# Patient Record
(demographics unavailable — no encounter records)

---

## 2024-10-23 NOTE — HISTORY OF PRESENT ILLNESS
[de-identified] : 28M who presents with imbalance since June. Around that time, he was on an extended flight to [facility]. His symptoms possibly started after that time. He feels imbalance and wobbly whenever he is walking. No vertigo. He has some ear symptoms of fullness and pressure when the imbalance is worse. No otalgia, otorrhea, tinnitus, acute hearing changes. He went to the ER a few times. CT head wo contrast was normal. Audiogram at [facility] was reportedly normal.

## 2024-10-23 NOTE — PHYSICAL EXAM
[TextEntry] : General: AAO, no significant distress Psychiatric: affect pleasant and within normal limits Eyes: relatively symmetric, no obvious nystagmus Skin: no significant lesions on face Nose: no significant lesions; patent. Oral Cavity & Oropharynx: no significant deformity or lesions Neck/Lymphatics: no significant masses or abnormal cervical nodes palpated Respiratory: breathing comfortably; no significant distress Neurologic: cranial nerves II-XII grossly intact; EOMI Facial function: symmetric   Ear examination performed under binocular otologic microscope: Left Ear External: Pinna and periauricular area is normal. Canal: Ear canal skin is not inflamed or edematous. Left cerumen impaction cleaned under microscopy with instrumentation Tympanic Membrane: Intact and in good position.   Right Ear External: Pinna and periauricular area is normal. Canal: Ear canal skin is not inflamed or edematous. Right cerumen impaction cleaned under microscopy with instrumentation Tympanic Membrane: Intact and in good position.   Procedure: Left cerumen removal Pre-operative Diagnosis: Left cerumen impaction Post-operative Diagnosis: Left cerumen impaction Anesthesia: None Procedure Details: The patient was placed in the supine position. The left ear canal was determined to be impacted with cerumen. A curette and/or suction was used to remove the cerumen impaction under microscopy. Condition: Stable. Patient tolerated procedure well. Complications: None.   Procedure: Right cerumen removal Pre-operative Diagnosis: Right cerumen impaction Post-operative Diagnosis: Right cerumen impaction Anesthesia: None Procedure Details: The patient was placed in the supine position. The right ear canal was determined to be impacted with cerumen. A curette and/or suction was used to remove the cerumen impaction under microscopy. Condition: Stable. Patient tolerated procedure well. Complications: None.   Fukuda without turn ROMBERG POSITIVE No gaze or spontaneous nystagmus Head thrust test negative Head shake nystagmus negative [place]-hallpike negative

## 2024-10-23 NOTE — PHYSICAL EXAM
[TextEntry] : General: AAO, no significant distress Psychiatric: affect pleasant and within normal limits Eyes: relatively symmetric, no obvious nystagmus Skin: no significant lesions on face Nose: no significant lesions; patent. Oral Cavity & Oropharynx: no significant deformity or lesions Neck/Lymphatics: no significant masses or abnormal cervical nodes palpated Respiratory: breathing comfortably; no significant distress Neurologic: cranial nerves II-XII grossly intact; EOMI Facial function: symmetric   Ear examination performed under binocular otologic microscope: Left Ear External: Pinna and periauricular area is normal. Canal: Ear canal skin is not inflamed or edematous. Left cerumen impaction cleaned under microscopy with instrumentation Tympanic Membrane: Intact and in good position.   Right Ear External: Pinna and periauricular area is normal. Canal: Ear canal skin is not inflamed or edematous. Right cerumen impaction cleaned under microscopy with instrumentation Tympanic Membrane: Intact and in good position.   Procedure: Left cerumen removal Pre-operative Diagnosis: Left cerumen impaction Post-operative Diagnosis: Left cerumen impaction Anesthesia: None Procedure Details: The patient was placed in the supine position. The left ear canal was determined to be impacted with cerumen. A curette and/or suction was used to remove the cerumen impaction under microscopy. Condition: Stable. Patient tolerated procedure well. Complications: None.   Procedure: Right cerumen removal Pre-operative Diagnosis: Right cerumen impaction Post-operative Diagnosis: Right cerumen impaction Anesthesia: None Procedure Details: The patient was placed in the supine position. The right ear canal was determined to be impacted with cerumen. A curette and/or suction was used to remove the cerumen impaction under microscopy. Condition: Stable. Patient tolerated procedure well. Complications: None.   Fukuda without turn ROMBERG POSITIVE No gaze or spontaneous nystagmus Head thrust test negative Head shake nystagmus negative [place]-hallpike negative

## 2024-10-23 NOTE — HISTORY OF PRESENT ILLNESS
[de-identified] : 28M who presents with imbalance since June. Around that time, he was on an extended flight to [facility]. His symptoms possibly started after that time. He feels imbalance and wobbly whenever he is walking. No vertigo. He has some ear symptoms of fullness and pressure when the imbalance is worse. No otalgia, otorrhea, tinnitus, acute hearing changes. He went to the ER a few times. CT head wo contrast was normal. Audiogram at [facility] was reportedly normal.

## 2024-10-23 NOTE — HISTORY OF PRESENT ILLNESS
[de-identified] : 28M who presents with imbalance since June. Around that time, he was on an extended flight to [facility]. His symptoms possibly started after that time. He feels imbalance and wobbly whenever he is walking. No vertigo. He has some ear symptoms of fullness and pressure when the imbalance is worse. No otalgia, otorrhea, tinnitus, acute hearing changes. He went to the ER a few times. CT head wo contrast was normal. Audiogram at [facility] was reportedly normal.

## 2024-10-23 NOTE — ASSESSMENT
[FreeTextEntry1] : Imbalance - discussed with the patient how the balance receives contributions from 4 systems: the brain, the eyes, the inner ear, and the legs/feet - discussed with the patient that each of these systems can be investigated - discussed with the patient that any eye issues should be evaluated by an ophthalmologist - pt wears glasses - discussed with the patient that any foot/leg issues should be evaluated by a podiatrist or orthopedic surgeon - pt with no foot/leg issues - MRI IAC w contrast to investigate the brain - VNG to investigate the inner ear - audiogram ordered for return visit - discussed that the diagnosis of mal de debarquement syndrome is likely - f/u after testing, telehealth  Bilateral cerumen impaction - Bilateral cerumen impaction removed under microscopy with instrumentation

## 2024-10-23 NOTE — PHYSICAL EXAM
[TextEntry] : General: AAO, no significant distress Psychiatric: affect pleasant and within normal limits Eyes: relatively symmetric, no obvious nystagmus Skin: no significant lesions on face Nose: no significant lesions; patent. Oral Cavity & Oropharynx: no significant deformity or lesions Neck/Lymphatics: no significant masses or abnormal cervical nodes palpated Respiratory: breathing comfortably; no significant distress Neurologic: cranial nerves II-XII grossly intact; EOMI Facial function: symmetric   Ear examination performed under binocular otologic microscope: Left Ear External: Pinna and periauricular area is normal. Canal: Ear canal skin is not inflamed or edematous. Left cerumen impaction cleaned under microscopy with instrumentation Tympanic Membrane: Intact and in good position.   Right Ear External: Pinna and periauricular area is normal. Canal: Ear canal skin is not inflamed or edematous. Right cerumen impaction cleaned under microscopy with instrumentation Tympanic Membrane: Intact and in good position.   Procedure: Left cerumen removal Pre-operative Diagnosis: Left cerumen impaction Post-operative Diagnosis: Left cerumen impaction Anesthesia: None Procedure Details: The patient was placed in the supine position. The left ear canal was determined to be impacted with cerumen. A curette and/or suction was used to remove the cerumen impaction under microscopy. Condition: Stable. Patient tolerated procedure well. Complications: None.   Procedure: Right cerumen removal Pre-operative Diagnosis: Right cerumen impaction Post-operative Diagnosis: Right cerumen impaction Anesthesia: None Procedure Details: The patient was placed in the supine position. The right ear canal was determined to be impacted with cerumen. A curette and/or suction was used to remove the cerumen impaction under microscopy. Condition: Stable. Patient tolerated procedure well. Complications: None.   Fukuda without turn ROMBERG POSITIVE No gaze or spontaneous nystagmus Head thrust test negative Head shake nystagmus negative [place]-hallpike negative

## 2024-11-25 NOTE — DATA REVIEWED
[de-identified] : MRI IAC w contrast slides visually reviewed and personally interpreted as follows (11/5/24): no retrocochlear pathology, IACs normal  MRI IAC w contrast radiology read: no evidence for vestibular schwannoma, no acute infarct, hemorrhage, or midline shift [de-identified] : Outside VNG reviewed and personally interpreted and my findings are as follows: ivan hallpike negative bilaterally caloric testing normal optokinetic testing normal pursuit and gaze testing normal

## 2024-11-25 NOTE — PHYSICAL EXAM
[TextEntry] : Deferred due to telehealth: Previous exam: General: AAO, no significant distress Psychiatric: affect pleasant and within normal limits Eyes: relatively symmetric, no obvious nystagmus Skin: no significant lesions on face Nose: no significant lesions; patent. Oral Cavity & Oropharynx: no significant deformity or lesions Neck/Lymphatics: no significant masses or abnormal cervical nodes palpated Respiratory: breathing comfortably; no significant distress Neurologic: cranial nerves II-XII grossly intact; EOMI Facial function: symmetric   Ear examination performed under binocular otologic microscope: Left Ear External: Pinna and periauricular area is normal. Canal: Ear canal skin is not inflamed or edematous. Left cerumen impaction cleaned under microscopy with instrumentation Tympanic Membrane: Intact and in good position.   Right Ear External: Pinna and periauricular area is normal. Canal: Ear canal skin is not inflamed or edematous. Right cerumen impaction cleaned under microscopy with instrumentation Tympanic Membrane: Intact and in good position.   Procedure: Left cerumen removal Pre-operative Diagnosis: Left cerumen impaction Post-operative Diagnosis: Left cerumen impaction Anesthesia: None Procedure Details: The patient was placed in the supine position. The left ear canal was determined to be impacted with cerumen. A curette and/or suction was used to remove the cerumen impaction under microscopy. Condition: Stable. Patient tolerated procedure well. Complications: None.   Procedure: Right cerumen removal Pre-operative Diagnosis: Right cerumen impaction Post-operative Diagnosis: Right cerumen impaction Anesthesia: None Procedure Details: The patient was placed in the supine position. The right ear canal was determined to be impacted with cerumen. A curette and/or suction was used to remove the cerumen impaction under microscopy. Condition: Stable. Patient tolerated procedure well. Complications: None.   Fukuda without turn ROMBERG POSITIVE No gaze or spontaneous nystagmus Head thrust test negative Head shake nystagmus negative [place]-hallpike negative

## 2024-11-25 NOTE — HISTORY OF PRESENT ILLNESS
[de-identified] : 28M who presents with imbalance since June. Around that time, he was on an extended flight to [facility]. His symptoms possibly started after that time. He feels imbalance and wobbly whenever he is walking. No vertigo. He has some ear symptoms of fullness and pressure when the imbalance is worse. No otalgia, otorrhea, tinnitus, acute hearing changes. He went to the ER a few times. CT head wo contrast was normal. Audiogram at [facility] was reportedly normal.   The provider, SILVA GOVEA, was located at the medical office located at [address] [place] [address] at the time of the visit. The patient and Provider participated in the telephonic visit. 11/25/24 (telehealth): Patient likely with mal de debarquement syndrome. Underwent MRI and outside VNG. He is emailing the VNG results to me. He states that his symptoms are not at all improved. He is still imbalanced everyday and has had to reduce physical activity which has affected his quality of life.

## 2024-11-25 NOTE — DATA REVIEWED
[de-identified] : MRI IAC w contrast slides visually reviewed and personally interpreted as follows (11/5/24): no retrocochlear pathology, IACs normal  MRI IAC w contrast radiology read: no evidence for vestibular schwannoma, no acute infarct, hemorrhage, or midline shift [de-identified] : Outside VNG reviewed and personally interpreted and my findings are as follows: ivan hallpike negative bilaterally caloric testing normal optokinetic testing normal pursuit and gaze testing normal

## 2024-11-25 NOTE — PHYSICAL EXAM
[TextEntry] : Deferred due to telehealth: Previous exam: General: AAO, no significant distress Psychiatric: affect pleasant and within normal limits Eyes: relatively symmetric, no obvious nystagmus Skin: no significant lesions on face Nose: no significant lesions; patent. Oral Cavity & Oropharynx: no significant deformity or lesions Neck/Lymphatics: no significant masses or abnormal cervical nodes palpated Respiratory: breathing comfortably; no significant distress Neurologic: cranial nerves II-XII grossly intact; EOMI Facial function: symmetric   Ear examination performed under binocular otologic microscope: Left Ear External: Pinna and periauricular area is normal. Canal: Ear canal skin is not inflamed or edematous. Left cerumen impaction cleaned under microscopy with instrumentation Tympanic Membrane: Intact and in good position.   Right Ear External: Pinna and periauricular area is normal. Canal: Ear canal skin is not inflamed or edematous. Right cerumen impaction cleaned under microscopy with instrumentation Tympanic Membrane: Intact and in good position.   Procedure: Left cerumen removal Pre-operative Diagnosis: Left cerumen impaction Post-operative Diagnosis: Left cerumen impaction Anesthesia: None Procedure Details: The patient was placed in the supine position. The left ear canal was determined to be impacted with cerumen. A curette and/or suction was used to remove the cerumen impaction under microscopy. Condition: Stable. Patient tolerated procedure well. Complications: None.   Procedure: Right cerumen removal Pre-operative Diagnosis: Right cerumen impaction Post-operative Diagnosis: Right cerumen impaction Anesthesia: None Procedure Details: The patient was placed in the supine position. The right ear canal was determined to be impacted with cerumen. A curette and/or suction was used to remove the cerumen impaction under microscopy. Condition: Stable. Patient tolerated procedure well. Complications: None.   Fukuda without turn ROMBERG POSITIVE No gaze or spontaneous nystagmus Head thrust test negative Head shake nystagmus negative [place]-hallpike negative

## 2024-11-25 NOTE — HISTORY OF PRESENT ILLNESS
[de-identified] : 28M who presents with imbalance since June. Around that time, he was on an extended flight to [facility]. His symptoms possibly started after that time. He feels imbalance and wobbly whenever he is walking. No vertigo. He has some ear symptoms of fullness and pressure when the imbalance is worse. No otalgia, otorrhea, tinnitus, acute hearing changes. He went to the ER a few times. CT head wo contrast was normal. Audiogram at [facility] was reportedly normal.   The provider, SILVA GOVEA, was located at the medical office located at [address] [place] [address] at the time of the visit. The patient and Provider participated in the telephonic visit. 11/25/24 (telehealth): Patient likely with mal de debarquement syndrome. Underwent MRI and outside VNG. He is emailing the VNG results to me. He states that his symptoms are not at all improved. He is still imbalanced everyday and has had to reduce physical activity which has affected his quality of life.

## 2024-11-25 NOTE — ASSESSMENT
[FreeTextEntry1] : Imbalance - 1 chronic illness with severe exacerbation/progression (has had to decrease daily physical activity affecting his quality of life) - discussed with the patient how the balance receives contributions from 4 systems: the brain, the eyes, the inner ear, and the legs/feet - discussed with the patient that each of these systems can be investigated - discussed with the patient that any eye issues should be evaluated by an ophthalmologist - pt wears glasses - discussed with the patient that any foot/leg issues should be evaluated by a podiatrist or orthopedic surgeon - pt with no foot/leg issues - MRI IAC w contrast to investigate the brain - NORMAL - Outside VNG to investigate the inner ear - NORMAL - discussed that the diagnosis of mal de debarquement syndrome is likely - will try nortriptyline 10mg QHS - The potential side effects of nortriptyline were discussed at length with the patient which include but are not limited to: dry mouth, constipation, blurred vision, drowsiness, urinary retention, weight loss or gain, excess sweating, dizziness, insomnia, nausea, tremor, confusion, anxiety, sexual dysfunction - TTM in 3 weeks - I will manage this patient's mal de debarquement and imbalance longitudinally

## 2025-05-16 NOTE — HISTORY OF PRESENT ILLNESS
[de-identified] : neck pain  duration: 8 years comes and goes  numbness/tingling bl hands/foot more on right  numbness/tingling bl arms more on right  No medications  No PT  Chiropractor minimal relief  No Images